# Patient Record
Sex: MALE | Race: WHITE
[De-identification: names, ages, dates, MRNs, and addresses within clinical notes are randomized per-mention and may not be internally consistent; named-entity substitution may affect disease eponyms.]

---

## 2017-01-04 ENCOUNTER — HOSPITAL ENCOUNTER (OUTPATIENT)
Dept: HOSPITAL 47 - RADMRIMAIN | Age: 37
Discharge: HOME | End: 2017-01-04
Payer: COMMERCIAL

## 2017-01-04 DIAGNOSIS — M51.36: Primary | ICD-10-CM

## 2017-01-04 PROCEDURE — 72148 MRI LUMBAR SPINE W/O DYE: CPT

## 2017-01-04 NOTE — MR
EXAMINATION TYPE: MR lumbar spine wo con

 

DATE OF EXAM: 1/4/2017 10:19 PM

 

COMPARISON: 1/21/2015

 

HISTORY: Reoccurring Low Back Pain / Rt Leg pain, Prior MRI in pacs

 

TECHNIQUE: 

Multiplanar, multisequence images of the lumbar spine were acquired.

 

 

 

FINDINGS:

Lumbar vertebra have normal alignment. Disc spaces are fairly well-maintained. There is slight narrow
ing at L4-5 discs. Lumbar nerve roots appear normal. The neural foramina appear within normal limits.
 Posterior elements appear intact. Sacroiliac joints appear normal. There is no paraspinal mass. Ther
e is no compression fracture. There is no spinal stenosis. There is minimal posterior disc bulging at
 all levels of the lumbar spine without impingement on the spinal canal to any extent.

 

IMPRESSION:

Negative MR scan of the lumbar spine. Minor degenerative disc changes at L4-5. No spinal stenosis or 
disc herniation. No change compared to last exam.

## 2017-07-15 ENCOUNTER — HOSPITAL ENCOUNTER (OUTPATIENT)
Dept: HOSPITAL 47 - RADCTMAIN | Age: 37
Discharge: HOME | End: 2017-07-15
Payer: COMMERCIAL

## 2017-07-15 DIAGNOSIS — R51: Primary | ICD-10-CM

## 2017-07-15 PROCEDURE — 70450 CT HEAD/BRAIN W/O DYE: CPT

## 2018-03-23 ENCOUNTER — HOSPITAL ENCOUNTER (OUTPATIENT)
Dept: HOSPITAL 47 - RADUSWWP | Age: 38
End: 2018-03-23
Payer: COMMERCIAL

## 2018-03-23 DIAGNOSIS — R94.5: Primary | ICD-10-CM

## 2018-03-23 PROCEDURE — 76705 ECHO EXAM OF ABDOMEN: CPT

## 2018-03-24 NOTE — US
EXAMINATION TYPE: US liver

 

DATE OF EXAM: 3/23/2018

 

COMPARISON: NONE

 

CLINICAL HISTORY: R94.5 Elevated Liver Test. Hepatitis C

 

EXAM MEASUREMENTS:

 

Liver Length:  17.3 cm   

Gallbladder Wall:  0.2 cm   

CBD:  0.3 cm

Right Kidney:  12.0 x 4.2 x 5.7 cm

 

 

 

Pancreas:  Obscured by bowel gas

Liver:  upper limits of normal in size  and echotexture is coarse

Gallbladder:  no evidence of stones

**Evidence for sonographic Petersen's sign:  no

CBD:  appears wnl as visualized 

Right Kidney:  no evidence of hydronephrosis or mass and cortical medullary differentiation is mainta
ined

 

There is no ascites.

 

IMPRESSION: Exam somewhat limited. Consider hepatic steatosis, hepatocellular disease.

## 2018-08-30 ENCOUNTER — HOSPITAL ENCOUNTER (EMERGENCY)
Dept: HOSPITAL 47 - EC | Age: 38
Discharge: HOME | End: 2018-08-30
Payer: COMMERCIAL

## 2018-08-30 VITALS
SYSTOLIC BLOOD PRESSURE: 138 MMHG | TEMPERATURE: 98.8 F | DIASTOLIC BLOOD PRESSURE: 78 MMHG | RESPIRATION RATE: 18 BRPM | HEART RATE: 82 BPM

## 2018-08-30 DIAGNOSIS — Z86.14: ICD-10-CM

## 2018-08-30 DIAGNOSIS — S61.252A: Primary | ICD-10-CM
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
## 2018-08-30 DIAGNOSIS — Z91.048: ICD-10-CM
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
## 2018-08-30 DIAGNOSIS — Z23: ICD-10-CM

## 2018-08-30 PROCEDURE — 99283 EMERGENCY DEPT VISIT LOW MDM: CPT

## 2018-08-30 PROCEDURE — 90471 IMMUNIZATION ADMIN: CPT

## 2018-08-30 PROCEDURE — 90375 RABIES IG IM/SC: CPT

## 2018-08-30 PROCEDURE — 96372 THER/PROPH/DIAG INJ SC/IM: CPT

## 2018-08-30 PROCEDURE — 90675 RABIES VACCINE IM: CPT

## 2018-08-30 NOTE — ED
General Adult HPI





- General


Chief complaint: Animal Bite


Stated complaint: bat bite


Time Seen by Provider: 08/30/18 09:43


Source: patient, RN notes reviewed


Mode of arrival: ambulatory


Limitations: no limitations





- History of Present Illness


Initial comments: 





Patient 38-year-old male presenting to the emergency room today with a chief 

complaint of a bite to the right middle finger from about that occurred 

yesterday.  He states he caught a bat in his basement and was trying to release 

outside.  He states that it did bite because he believes he got scared his 

right middle finger.  Patient states did not draw blood initially but later did 

see some small blood when he tried to milk out the area.  Patient states that 

he did follow-up with his family physician and was advised come here to 

emergency room for rabies vaccination.  Patient states they updated tetanus 

today.  Denies any other complaints. Patient denies any recent fever, chills, 

shortness of breath, chest pain, back pain, abdominal pain, nausea or vomiting, 

numbness or tingling, or any other complaints.





- Related Data


 Allergies











Allergy/AdvReac Type Severity Reaction Status Date / Time


 


Bleach (Sodium Hypochlorite) Allergy  Unknown Verified 08/30/18 09:40














Review of Systems


ROS Statement: 


Those systems with pertinent positive or pertinent negative responses have been 

documented in the HPI.





ROS Other: All systems not noted in ROS Statement are negative.





Past Medical History


Past Medical History: No Reported History


History of Any Multi-Drug Resistant Organisms: MRSA


Date of last positivie culture/infection: 2006


MDRO Source:: LEFT LEG


Past Surgical History: No Surgical Hx Reported, Orthopedic Surgery


Past Psychological History: No Psychological Hx Reported


Smoking Status: Never smoker


Past Alcohol Use History: Occasional


Past Drug Use History: None Reported





General Exam





- General Exam Comments


Initial Comments: 





General:  The patient is awake and alert, in no distress, and does not appear 

acutely ill. 


Eye:   extra-ocular movements are intact.  No nystagmus.  There is normal 

conjunctiva bilaterally.  No signs of icterus.  


Ears, nose, mouth and throat:  There are moist mucous membranes and no oral 

lesions. 


Neck:  The neck is supple.  


Musculoskeletal:  Normal ROM, no tenderness.  Strength 5/5. Sensation intact. 

Pulses equal bilaterally 2+.  


Neurological:  A&O x 3. CN II-XII intact, There are no obvious motor or sensory 

deficits. Coordination appears grossly intact. Speech is normal.


Skin:  Skin is warm and dry and no rashes or lesions are noted. 


Psychiatric:  Cooperative, appropriate mood & affect, normal judgment.  


Limitations: no limitations





Course


 Vital Signs











  08/30/18





  09:35


 


Temperature 97.8 F


 


Pulse Rate 96


 


Respiratory 16





Rate 


 


Blood Pressure 121/79


 


O2 Sat by Pulse 100





Oximetry 














Medical Decision Making





- Medical Decision Making





38-year-old male who did get bitten by a bat to the right middle finger 

yesterday.  Patient was given rabies vaccine and immunoglobulin here in 

emergency room.  His tetanus was previously updated.  Patient will be 

discharged home.  He'll be given a prescription for repeat rabies vaccine on 

days 3, 7, 14.  Patient advised return for any other concerns.





Disposition


Clinical Impression: 


 Bite by animal





Disposition: HOME SELF-CARE


Condition: Good


Instructions:  Animal Bite (ED)


Additional Instructions: 


Please follow-up and have rabies vaccine administered on days 3, 7, and 14 as 

discussed.  Please return to emergency room for any other concerns.


Is patient prescribed a controlled substance at d/c from ED?: No


Referrals: 


Bj Palacios MD [Primary Care Provider] - 1-2 days


Time of Disposition: 11:09